# Patient Record
(demographics unavailable — no encounter records)

---

## 2025-06-06 NOTE — IMAGING
[de-identified] : L Shoulder: Swelling: none Deformity: none Atrophy: none Scars: none Scapular Winging: negative   Tenderness: bicep tendon   ROM: Flexion: Passive: 160, Active: 120 Abduction: Passive: 90, Active: 70 Ext Rotation at 0: Passive: 80, Active: 60     Strength: Testing limited 2/2 lack of ROM, grossly intact as listed below Supra - 4/5 Infra - 4/5 Subscap - 5/5 Teres - 5/5   Signs: Neer: pos Trent: pos Jobes: neg for weakness Arm Drop: neg Hornblower: neg Speeds: pos Belly Press: neg Lift Off: neg Spurlings: neg   Motor and sensation are grossly intact in all distributions [Left] : left shoulder [There are no fractures, subluxations or dislocations. No significant abnormalities are seen] : There are no fractures, subluxations or dislocations. No significant abnormalities are seen [FreeTextEntry1] : Cystic change within the greater tuberosity

## 2025-06-06 NOTE — DISCUSSION/SUMMARY
[de-identified] : Marc has symptoms consistent with a left shoulder rotator cuff tear.  He has a history of a prior arthroscopy in 2014 and states he did well afterwards but does note that the shoulder never felt normal.  He had an acute injury with severe pain in his left shoulder and limited ability to lift his arm and he has weakness on testing today.  He has no radicular symptoms.  I ordered an MRI of his left shoulder to evaluate for possible acute rotator cuff tear.  I will see him back after the MRI to discuss the results.  I offered him a prescription for anti-inflammatories which he declined he will use over-the-counter ibuprofen or Motrin.  Appropriate use and GI precautions were discussed at length.  All of his questions were answered he is in agreement with this plan.

## 2025-06-24 NOTE — DISCUSSION/SUMMARY
[de-identified] : MRI reviewed Symptoms most c/w bicep tendinopathy/sub scap tear, US guided bicep CSI given today PT script continue OTC NSAIDs, discussed appropriate use and GI precautions F/u 6 weeks for recheck All questions answered, agreeable with plan

## 2025-06-24 NOTE — DATA REVIEWED
[MRI] : MRI [Left] : left [Shoulder] : shoulder [I independently reviewed and interpreted images and report] : I independently reviewed and interpreted images and report [FreeTextEntry1] : thinning of supraspinatus tendon, high grade partial tearing of subscap tendon, mild GH arthritis, cystic change in GT c/w prior cuff repair/anchor placement

## 2025-06-24 NOTE — HISTORY OF PRESENT ILLNESS
[Work related] : work related [de-identified] : 6/24/25- pt is here to go over MRI results  SCOTT MCNEILL is a 65 year M here with LT shoulder pain.  Pain has been present since 05/27/25 and is located anteriorly and laterally.     Injury: pt since to have been lifting a bag of marble chips and felt pain after . Pain with overhead motion: Y Pain at night: Y Other exacerbating motions/activities: N Neck pain: N Radicular pain past the elbow: N Pain is improving: N     Previous treatment: NSAIDs: N PT: N Injections: CSI 2014 AND 2022 Surgery: rotator cuff repair 2014  Activity Modifications: [FreeTextEntry3] : 05/27/25

## 2025-06-24 NOTE — PROCEDURE
[FreeTextEntry1] : US Bicep tendon CSI [FreeTextEntry2] : pain [FreeTextEntry3] : After verbal consent was obtained, the patient was placed sitting on the exam table the posterior aspect of the L exposed.  Ultrasound machine was used to localize the humeral head, glenoid, rotator cuff, and subacromial space.  The injection site was prepped with ETOH.  A 22-gauge needle was used to inject 40 mg of Kenalog and 4 cc of Marcaine into the bicep tendon sheath.  The needle was then removed and the injection site was cleaned and a Band-Aid was placed. The patient tolerated the procedure well without apparent complications.  Concerning signs and symptoms of infection were discussed with the patient at length.  They were able to state understanding and they were instructed to call the office should these occur.

## 2025-06-24 NOTE — IMAGING
[Left] : left shoulder [There are no fractures, subluxations or dislocations. No significant abnormalities are seen] : There are no fractures, subluxations or dislocations. No significant abnormalities are seen [de-identified] : L Shoulder: Swelling: none Deformity: none Atrophy: none Scars: none Scapular Winging: negative   Tenderness: bicep tendon   ROM: Flexion: Passive: 160, Active: 120 Abduction: Passive: 90, Active: 70 Ext Rotation at 0: Passive: 80, Active: 60     Strength: Testing limited 2/2 lack of ROM, grossly intact as listed below Supra - 4/5 Infra - 4/5 Subscap - 5/5 Teres - 5/5   Signs: Neer: pos Trent: pos Jobes: neg for weakness Arm Drop: neg Hornblower: neg Speeds: pos Belly Press: neg Lift Off: neg Spurlings: neg   Motor and sensation are grossly intact in all distributions [FreeTextEntry1] : Cystic change within the greater tuberosity